# Patient Record
(demographics unavailable — no encounter records)

---

## 2017-09-11 NOTE — MRI PRELIMINARY REPORT
Accession: [id number]

Exam: MRI Lumbar Spine W/O

 

IMPRESSION: 

1. Spondylosis throughout the lumbar spine as noted above. Degenerative disk and facet changes seen d
iffusely.

 

2. L3-L4: Spondylosis with degenerative facet change predominating. Moderate canal stenosis. Bilatera
l mild foraminal stenosis.

 

3. L4-L5: Spondylosis with degenerative facet change predominating. Marked canal stenosis. Asymmetric
 left foraminal disk protrusion is present with moderate left foraminal stenosis.

 

4. L5-S1: Spondylosis with moderate degenerative facet change. 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
ve roots is seen. This causes the exiting left L5 nerve root to be more inferior in location rather t
singletary descending in the left L5 lateral recess. Because of this orientation and associated degenerative
 facet change, moderate left foraminal stenosis is present with mass effect on exiting left L5 nerve 
root.

 

Comment: The following findings are so common in adults without low back pain that while we report th
eir presence, they must be interpreted with caution and in the context of the clinical situation. (Re
sharon Aranda et al, Spine 2001)

 

Prevalence of findings in patients without low back pain:

Disk degeneration (any evidence): 92%

Disk desiccation/T2 signal loss: 83%

Disk height loss: 56%

Disk bulge: 64%

Disk protrusion: 32%

Annular tear/high intensity zone: 38%

 

[facility]

 

SITE ID: [id number]

## 2017-09-11 NOTE — MRI REPORT
EXAM:

MRI LUMBAR SPINE WITHOUT CONTRAST

 

EXAM DATE: 9/11/2017 03:08 PM.

 

CLINICAL HISTORY: L SCIATICA 6 MOS LEG WEAKNESS. History of bladder cancer.

 

COMPARISON: None.

 

TECHNIQUE: Multiplanar, multisequence T1-weighted and fluid-sensitive sequences of the lumbar spine f
rom T12 to S1 without contrast. Other: None.

 

FINDINGS: 

Spinal Cord: The conus terminates at L1-L2. The conus medullaris is unremarkable. Crowding in curvili
near appearance to cauda equina nerve roots is seen throughout the lumbar thecal sac. Stretched appea
taz to distal cauda equina nerve roots is seen at the lumbosacral junction.

 

Alignment: Normal. No scoliosis or spondylolisthesis.

 

Bone Marrow: Five non-rib-bearing lumbar vertebral bodies are assumed. No gross fractures or bone les
ions. No bone marrow edema.

 

Disk Levels/Facets:

T12-L1: Unremarkable on sagittal series. Mild anterior and right anterolateral osteophyte formation i
s seen.

 

L1-L2: Minimal loss of disk space height is seen. Minimal dorsal disk bulge is present. Mild anterior
 and anterolateral osteophyte formation. No stenosis.

 

L2-L3: Mild degenerative facet changes seen bilaterally. Mild thickening of the ligamentum flavum is 
noted. Moderate loss of disk space height is seen. Moderate lateral and ventral protrusion of disk/os
teophyte complex is seen. Effacement of the thecal sac is noted. No stenosis.

 

L3-L4: Moderate hypertrophic degenerative facet changes seen bilaterally. Moderate loss of disk space
 height is seen. Mild diskogenic endplate signal abnormality is present. Moderate lateral and ventral
 protrusion of disk/osteophyte complex is seen. Asymmetric right inferior foraminal and right lateral
 disk protrusion is seen. Effacement of the thecal sac is noted with crowding of cauda equina nerve r
oots. Moderate canal stenosis. Mild bilateral foraminal stenosis.

 

L4-L5: Moderate hypertrophic degenerative facet changes seen. Mild loss of disk space height is seen.
 While circumferential disk bulge is present. Asymmetric left foraminal disk protrusion is seen. Effa
cement of the thecal sac with crowding of cauda equina nerve roots is present. Marked canal stenosis 
is seen. Mild effacement of exiting right L4 nerve root is seen without right foraminal stenosis. Mas
s effect and effacement of exiting left L4 nerve root is present, with moderate left foraminal stenos
is.

 

L5-S1: Moderate hypertrophic degenerative facet changes seen bilaterally. Note is made of conjoined o
rigin of left L5 and S1 nerve roots. This places the exiting left L5 nerve root inferiorly within the
 foramen. Because of the degenerative facet change, there is mass effect and effacement of exiting le
ft L5 nerve root with moderate left foraminal stenosis.

 

Musculature: Normal. No edema or fatty atrophy. 

 

Other: The partially visualized retroperitoneum is unremarkable.

 

IMPRESSION: 

1. Spondylosis throughout the lumbar spine as noted above. Degenerative disk and facet changes seen d
iffusely.

 

2. L3-L4: Spondylosis with degenerative facet change predominating. Moderate canal stenosis. Bilatera
l mild foraminal stenosis.

 

3. L4-L5: Spondylosis with degenerative facet change predominating. Marked canal stenosis. Asymmetric
 left foraminal disk protrusion is present with moderate left foraminal stenosis.

 

4. L5-S1: Spondylosis with moderate degenerative facet change. 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
ve roots is seen. This causes the exiting left L5 nerve root to be more inferior in location rather t
singletary descending in the left L5 lateral recess. Because of this orientation and associated degenerative
 facet change, moderate left foraminal stenosis is present with mass effect on exiting left L5 nerve 
root.

 

Comment: The following findings are so common in adults without low back pain that while we report th
eir presence, they must be interpreted with caution and in the context of the clinical situation. (Re
sharon Aranda et al, Spine 2001)

 

Prevalence of findings in patients without low back pain:

Disk degeneration (any evidence): 92%

Disk desiccation/T2 signal loss: 83%

Disk height loss: 56%

Disk bulge: 64%

Disk protrusion: 32%

Annular tear/high intensity zone: 38%

 

[facility]

Referring Provider Line: [phone]

 

SITE ID: [id number]

## 2018-02-04 NOTE — MRI REPORT
EXAM:

MRI CERVICAL SPINE WITHOUT CONTRAST

 

EXAM DATE: 2/3/2018 01:27 PM.

 

CLINICAL HISTORY: Cervicalgia.

 

COMPARISONS: CT scan of the cervical spine without contrast 08/20/2016.

 

TECHNIQUE: Multiplanar, multisequence T1-weighted and fluid-sensitive sequences of the cervical spine
 without contrast. Other: None.

 

FINDINGS: 

 

There is straightening of the normal cervical lordosis. There is a moderate decrease in the height of
 the disk at C4-C5, mild to moderate at C5-C6, mild at C6-C7 and C7-T1. There is diffuse desiccation 
of the disk spaces throughout the cervical spine except for C4-C5 which exhibits fat intensity within
 the disk space.

 

There is questionable subtle elevation of the signal intensities present within the spinal cord at th
e C5 through C6 levels. This may reflect a small amount of superimposed myelomalacia versus edema.

 

The anterior to posterior dimension of the bony cervical canal is less than 14mm consistent with a co
ngenital stenosis. 

 

There is endplate spondylosis demonstrated throughout the cervical spine.

 

The craniocervical junction is normal. 

 

There is a mixture of Modic type I and type II endplate degenerative change within the C3-C4 through 
C6-C7 endplates.

 

C2-C3: There is moderate right facet arthropathy. The uncovertebral hypertrophy produces moderate to 
severe right foraminal stenosis. There is moderate left foraminal stenosis. Recommend correlation for
 any right C3 radicular symptoms. There is mild ligamentum flavum hypertrophy. There is a mild centra
l canal stenosis. There is minimal disk osteophyte complex abutting the sac.

 

C3-C4: There is a small disk osteophyte complex abutting the sac. There is a mild central canal steno
sis. The uncovertebral hypertrophy produces moderate to severe bilateral foraminal stenoses. There is
 moderate left facet arthropathy. Recommend correlation for C4 radiculopathies. There is mild right f
acet arthropathy. There is no significant change at this level. 

 

C4-C5: There is a small disk osteophyte complex abutting the sac producing a mild central canal steno
sis. The uncovertebral hypertrophy produces moderate to severe left foraminal stenosis in combination
 with severe left facet arthropathy. Recommend correlation for a left CT radiculopathy. There is mode
rate narrowing of the right neural foramen. There is no significant change at this level. 

 

C5-C6: There is a broad-based disk osteophyte complex abutting the sac. There is mild ligamentum flav
um hypertrophy. This combination produces a moderate to slightly greater degree of central canal sten
osis. Recommend correlation for any myelopathic symptoms. Again there is subtle T2 hyperintensity sug
gested on the sagittal STIR images. I cannot exclude a mild degree of superimposed edema or myelomala
bri. There is severe bilateral foraminal stenoses from uncovertebral hypertrophy. There is mild to mo
derate left and mild right facet arthropathy. There is no significant change at this level. 

 

C6-C7: There is a small disk osteophyte complex eccentric to the right with superimposed right uncove
rtebral hypertrophy producing severe narrowing of the right neural foramen. Recommend correlation for
 right C7 radiculopathy. There is mild ligamentum flavum hypertrophy. There is a moderate central can
al stenosis. The facets are normal. 

 

C7-T1: There is a small disk osteophyte complex with superimposed central extrusion of the disk. Ther
e is mild ligamentum flavum hypertrophy. There is a mild-to-moderate central canal stenosis. There is
 mild bilateral facet arthropathy. There is no significant change at this level. 

 

IMPRESSION: 

1. There is a broad-based disk osteophyte complex abutting the sac at C5-C6 which in combination with
 ligamentum flavum hypertrophy and facet arthropathy produces a moderate to slightly greater degree o
f central canal stenosis. There is questionable superimposed edema versus myelomalacia at this level.
 Recommend correlation for any myelopathic symptoms. There are severe bilateral foraminal stenoses wh
ich may produce C6 radiculopathies. 

2. There is a small disk osteophyte complex at C6-C7 eccentric to the right with right-sided uncovert
ebral hypertrophy producing severe narrowing of the right neural foramen. Recommend correlation for r
ight C7 radiculopathy. There is a moderate central canal stenosis. 

3. There is a mild-to-moderate central canal stenosis at C7-T1 from small disk osteophyte complex wit
h superimposed central extrusion of the disk. 

4. There is a mild central canal stenosis at C4-C5 from a small disk osteophyte complex. There is mod
erate to severe left foraminal stenosis. Recommend correlation for left C5 radiculopathy. 

5. There are moderate to severe bilateral foraminal stenoses from uncovertebral hypertrophy at C3-C4.
 Recommend correlation for C4 radiculopathies. There is a mild central canal stenosis from a small di
sk osteophyte complex. 

6. There is moderate to severe right foraminal stenosis at C2-C3. Recommend correlation for right C3 
radiculopathy. There is a mild central canal stenosis. 

7. 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

Referring Provider Line: [phone]

 

SITE ID: [id number]